# Patient Record
Sex: MALE | Race: BLACK OR AFRICAN AMERICAN | NOT HISPANIC OR LATINO | Employment: OTHER | URBAN - METROPOLITAN AREA
[De-identification: names, ages, dates, MRNs, and addresses within clinical notes are randomized per-mention and may not be internally consistent; named-entity substitution may affect disease eponyms.]

---

## 2017-01-05 ENCOUNTER — OFFICE VISIT (OUTPATIENT)
Dept: FAMILY MEDICINE | Facility: CLINIC | Age: 52
End: 2017-01-05
Payer: OTHER GOVERNMENT

## 2017-01-05 VITALS
SYSTOLIC BLOOD PRESSURE: 135 MMHG | WEIGHT: 226.19 LBS | HEART RATE: 78 BPM | BODY MASS INDEX: 32.38 KG/M2 | DIASTOLIC BLOOD PRESSURE: 84 MMHG | HEIGHT: 70 IN

## 2017-01-05 DIAGNOSIS — Z76.89 ESTABLISHING CARE WITH NEW DOCTOR, ENCOUNTER FOR: Primary | ICD-10-CM

## 2017-01-05 DIAGNOSIS — K21.9 GASTROESOPHAGEAL REFLUX DISEASE WITHOUT ESOPHAGITIS: ICD-10-CM

## 2017-01-05 DIAGNOSIS — R73.03 PREDIABETES: ICD-10-CM

## 2017-01-05 DIAGNOSIS — E78.5 HYPERLIPIDEMIA, UNSPECIFIED HYPERLIPIDEMIA TYPE: ICD-10-CM

## 2017-01-05 PROCEDURE — 99999 PR PBB SHADOW E&M-EST. PATIENT-LVL III: CPT | Mod: PBBFAC,,, | Performed by: FAMILY MEDICINE

## 2017-01-05 PROCEDURE — 99213 OFFICE O/P EST LOW 20 MIN: CPT | Mod: PBBFAC,PO | Performed by: FAMILY MEDICINE

## 2017-01-05 PROCEDURE — 99214 OFFICE O/P EST MOD 30 MIN: CPT | Mod: S$PBB,,, | Performed by: FAMILY MEDICINE

## 2017-01-05 RX ORDER — ASPIRIN 81 MG/1
81 TABLET ORAL DAILY
COMMUNITY
End: 2017-04-06 | Stop reason: SDUPTHER

## 2017-01-05 NOTE — PROGRESS NOTES
Subjective:       Patient ID: Jose Dior is a 51 y.o. male.    Chief Complaint: Establish Care    HPI     Establish Care  Pt reports to the clinic to establish care.   The pt has a medical history which includes DLD, GERD, and prediabetes.   As far as smoking is concerned, the pt denies.   The pt attempts to maintain a healthy diet and engages in regular exercise.   Consistent seatbelt usage reported.   Pt has no symptoms of depression.   Health maintenance addressed and updated in chart.    Review of Systems   Constitutional: Negative for chills and fever.   Respiratory: Negative for chest tightness and shortness of breath.    Cardiovascular: Negative for chest pain and leg swelling.   Gastrointestinal: Negative for abdominal pain, constipation, diarrhea and vomiting.   Genitourinary: Negative for decreased urine volume, dysuria and hematuria.   Musculoskeletal: Negative for arthralgias.   Neurological: Negative for weakness and light-headedness.       Objective:      Physical Exam   Constitutional: He appears well-developed and well-nourished. No distress.   Obese AAM   HENT:   Head: Normocephalic and atraumatic.   Mouth/Throat: Oropharynx is clear and moist. No oropharyngeal exudate.   Eyes: EOM are normal. Pupils are equal, round, and reactive to light.   Neck: Normal range of motion. Neck supple. No thyromegaly present.   Cardiovascular: Normal rate, regular rhythm, normal heart sounds and intact distal pulses.    Pulmonary/Chest: Effort normal and breath sounds normal. No respiratory distress. He has no wheezes.   Abdominal: Soft. Bowel sounds are normal. He exhibits no distension and no mass. There is no tenderness.   Musculoskeletal: He exhibits no edema.   Lymphadenopathy:     He has no cervical adenopathy.   Neurological: He is alert.   Skin: Skin is warm. No rash noted. No erythema.   Psychiatric: He has a normal mood and affect. His behavior is normal.   Vitals reviewed.      Assessment:        1. Establishing care with new doctor, encounter for    2. Gastroesophageal reflux disease without esophagitis    3. Adult BMI 30+    4. Hyperlipidemia, unspecified hyperlipidemia type        Plan:       1. Establishing care with new doctor, encounter for    2. Gastroesophageal reflux disease without esophagitis  Condition currently stable. No changes to medication regimen on today.    3. Adult BMI 30+  Patient has been advised to continue to maintain a healthy lifestyle, including regular exercise and consuming a well balanced diet.   - Basic metabolic panel; Future  - Lipid panel; Future  - Microalbumin/creatinine urine ratio; Future    4. Hyperlipidemia, unspecified hyperlipidemia type  - Basic metabolic panel; Future  - Lipid panel; Future  - Microalbumin/creatinine urine ratio; Future    5. Prediabetes  - Basic metabolic panel; Future  - Hemoglobin A1c; Future    Patient readiness: acceptance and barriers:none    During the course of the visit the patient was educated and counseled about the following:     Obesity:   Informal exercise measures discussed, e.g. taking stairs instead of elevator.  Regular aerobic exercise program discussed.    Goals: Obesity: Reduce calorie intake and BMI    Did patient meet goals/outcomes: No    The following self management tools provided: excercise log    Patient Instructions (the written plan) was given to the patient/family.     Time spent with patient: 15 minutes    Portions of this note were created using Dragon voice recognition software. There may be voice recognition errors found in the text, and attempts were made to correct these errors prior to signature    Jessee Lopez MD    Family Medicine  1/5/2017

## 2017-01-05 NOTE — MR AVS SNAPSHOT
[facility]  [address] [place] [address]  Phone: [phone]  Fax: [phone]                  Jose Dior   2017 9:40 AM   Office Visit    Description:  Male : 1965   Provider:  Jessee Lopez MD   Department:  [facility]           Reason for Visit     Establish Care           Diagnoses this Visit        Comments    Establishing care with new doctor, encounter for    -  Primary     Gastroesophageal reflux disease without esophagitis         Adult BMI 30+         Hyperlipidemia, unspecified hyperlipidemia type         Prediabetes                To Do List           Future Appointments        Provider Department Dept Phone    2017 9:15 AM LABROSALIA SAT [facility] - Lab [phone]    2017 9:30 AM SPECIMEN, ROSALIA [facility] - Lab [phone]    2017 9:40 AM Jessee Lopez MD [facility] [phone]      Goals (5 Years of Data)     None      Follow-Up and Disposition     Return in about 3 months (around 2017).    Follow-up and Disposition History      [facility]     [facility] -  Assistance  Registered nurses in the [facility] provide clinical advisement, health education, appointment booking, and other advisory services.  Call for this free service at [phone].             Medications           Message regarding Medications     Verify the changes and/or additions to your medication regime listed below are the same as discussed with your clinician today.  If any of these changes or additions are incorrect, please notify your healthcare provider.             Verify that the below list of medications is an accurate representation of the medications you are currently taking.  If none reported, the list may be blank. If incorrect, please contact your healthcare provider. Carry this list with you in case of emergency.           Current Medications      "aspirin (ECOTRIN) 81 MG EC tablet Take 81 mg by mouth once daily.    esomeprazole (NEXIUM) 40 MG capsule Take 1 capsule (40 mg total) by mouth before breakfast.    metformin (GLUCOPHAGE) 500 MG tablet Take 1 tablet (500 mg total) by mouth daily with breakfast.    pravastatin (PRAVACHOL) 40 MG tablet Take 40 mg by mouth once daily. Patient takes one-half of 40 MG of tablet. Taking 20 MG daily.    temazepam (RESTORIL) 30 mg capsule Take 30 mg by mouth nightly as needed for Insomnia.           Clinical Reference Information           Vital Signs - Last Recorded  Most recent update: 1/5/2017  9:57 AM by Mirella Patel MA    BP Pulse Ht Wt BMI    135/84 78 5' 10" (1.778 m) 102.6 kg (226 lb 3.1 oz) 32.46 kg/m2      Blood Pressure          Most Recent Value    BP  135/84      Allergies as of 1/5/2017     No Known Allergies      Immunizations Administered on Date of Encounter - 1/5/2017     None      Orders Placed During Today's Visit     Future Labs/Procedures Expected by Expires    Basic metabolic panel  1/5/2017 3/6/2018    Hemoglobin A1c  1/5/2017 3/6/2018    Lipid panel  1/5/2017 3/6/2018    Microalbumin/creatinine urine ratio  1/5/2017 3/6/2018      Instructions      Weight Management: Getting Started  Healthy bodies come in all shapes and sizes. Not all bodies are made to be thin. For some people, a healthy weight is higher than the average weight listed on weight charts. Your healthcare provider can help you decide on a healthy weight for you.    Reasons to lose weight  Losing weight can help with some health problems, such as high blood pressure, heart disease, diabetes, sleep apnea, and arthritis. You may also feel more energy.  Set your long-term goal  Your goal doesn't even have to be a specific weight. You may decide on a fitness goal (such as being able to walk 10 miles a week), or a health goal (such as lowering your blood pressure). Choose a goal that is measurable and reasonable, so you know when " you've reached it. A goal of reaching a BMI of less than 25 is not always reasonable (or possible).   Make an action plan  Habits dont change overnight. Setting your goals too high can leave you feeling discouraged if you cant reach them. Be realistic. Choose one or two small changes you can make now. Set an action plan for how you are going to make these changes. When you can stick to this plan, keep making a few more small changes. Taking small steps will help you stay on the path to success.  Track your progress  Write down your goals. Then, keep a daily record of your progress. Write down what you eat and how active you are. This record lets you look back on how much youve done. It may also help when youre feeling frustrated. Reward yourself for success. Even if you dont reach every goal, give yourself credit for what you do get done.  Get support  Encouragement from others can help make losing weight easier. Ask your family members and friends for support. They may even want to join you. Also look to your healthcare provider, registered dietitian, and  for help. Your local hospital can give you more information about nutrition, exercise, and weight loss.  © 4802-1251 [employer]. [address] [place] [address], [place] [address]. All rights reserved. This information is not intended as a substitute for professional medical care. Always follow your healthcare professional's instructions.        Diabetes (General Information)  Diabetes is a long-term health problem. It means your body does not make enough insulin. Or it may mean that your body cannot use the insulin it makes. Insulin is a hormone in your body. It lets blood sugar (glucose) reach the cells in your body. All of your cells need glucose for fuel.  When you have diabetes, the glucose in your blood builds up because it cannot get into the cells. This buildup is called high blood sugar (hyperglycemia).  Your blood sugar  level depends on several things. It depends on what kind of food you eat and how much of it you eat. It also depends on how much exercise you get, and how much insulin you have in your body. Eating too much of the wrong kinds of food or not taking diabetes medicine on time can cause high blood sugar. Infections can cause high blood sugar even if you are taking medicines correctly.  These things can also cause low blood sugar:  · Missing meals  · Not eating enough food  · Taking too much diabetes medicine  Diabetes can cause serious problems over time if you do not get treated. These problems include heart disease, stroke, kidney failure, and blindness. They also include nerve pain or loss of feeling in your legs and feet, and gangrene of the feet. By keeping your blood sugar under control you can prevent or delay these problems.  Normal blood sugar levels are 80 to 100 before a meal and less than 180 in the 1 to 2 hours after a meal.  Home care  Follow these guidelines when caring for yourself at home:  · Follow the diet your healthcare provider gives you. Take insulin or other diabetes medicine exactly as told to.  · Watch your blood sugar as you are told to. Keep a log of your results. This will help your provider change your medicines to keep your blood sugar under control.  · Try to reach your ideal weight. You may be able to cut back on or not have to take diabetes medicine if you eat the right foods and get exercise.  · Do not smoke. Smoking worsens the effects of diabetes on your circulation. You are much more likely to have a heart attack if you have diabetes and you smoke.  · Take good care of your feet. If you have lost feeling in your feet, you may not see an injury or infection. Check your feet and between your toes at least once a week.  · Wear a medical alert bracelet or necklace, or carry a card in your wallet that says you have diabetes. This will help healthcare providers give you the right care if  you get very ill and cannot tell them that you have diabetes.  Sick day plan  If you get a cold, the flu, or a bacterial or viral infection, take these steps:  · Look at your diabetes sick plan and call your healthcare provider as you were told to. You may need to call your provider right away if:  ¨ Your blood sugar is above 240 while taking your diabetes medicine  ¨ Your urine ketone levels are above normal or high  ¨ You have been vomiting more than 6 hours  ¨ You have trouble breathing or your breath ha s a fruity smell  ¨ You have a high fever  ¨ You have a fever for several days and you are not getting better  ¨ You get light-headed and are sleepier than usual  · Keep taking your diabetes pills (oral medicine) even if you have been vomiting and are feeling sick. Call your provider right away because you may need insulin to lower your blood sugar until you recover from your illness.  · Keep taking your insulin even if you have been vomiting and are feeling sick. Call your provider right away to ask if you need to change your insulin dose. This will depend on your blood sugar results.  · Check your blood sugar every 2 to 4 hours, or at least 4 times a day.  · Check your ketones often. If you are vomiting and having diarrhea, watch them more often.  · Do not skip meals. Try to eat small meals on a regular schedule. Do this even if you do not feel like eating.  · Drink water or other liquids that do not have caffeine or calories. This will keep you from getting dehydrated. If you are nauseated or vomiting, takes small sips every 5 minutes. To prevent dehydration try to drink a cup (8 ounces) of fluids every hour while you are awake.  General care  Always bring a source of fast-acting sugar with you in case you have symptoms of low blood sugar (below 70). At the first sign of low blood sugar, eat or drink 15 to 20 grams of fast-acting sugar to raise your blood sugar. Examples are:  · 3 to 4 glucose tablets. You can  buy these at most [employer].  · 4 ounces (1/2 cup) of regular (not diet) soft drinks  · 4 ounces (1/2 cup) of any fruit juice  · 8 ounces (1 cup) of milk  · 5 to 6 pieces of hard candy  · 1 tablespoon of honey  Check your blood sugar 15 minutes after treating yourself. If it is still below 70, take 15 to 20 more grams of fast-acting sugar. Test again in 15 minutes. If it returns to normal (70 or above), eat a snack or meal to keep your blood sugar in a safe range. If it stays low, call your doctor or go to an emergency room.  Follow-up care  Follow-up with your healthcare provider, or as advised. For more information about diabetes, visit the American Diabetes Association website at www.diabetes.org or call 759-165-8563.  When to seek medical advice  Call your healthcare provider right away if you have any of these symptoms of high blood sugar:  · Frequent urination  · Dizziness  · Drowsiness  · Thirst  · Headache  · Nausea or vomiting  · Abdominal pain  · Eyesight changes  · Fast breathing  · Confusion or loss of consciousness  Also call your provider right away if you have any of these signs of low blood sugar:  · Fatigue  · Headache  · Shakes  · Excess sweating  · Hunger  · Feeling anxious or restless  · Eyesight changes  · Drowsiness  · Weakness  · Confusion or loss of consciousness  Call 911  Call for emergency help right away if any of these occur:  · Chest pain or shortness of breath  · Dizziness or fainting  · Weakness of an arm or leg or one side of the face  · Trouble speaking or seeing   © 1367-6249 [employer]. [address] [place] [address], [place] [address]. All rights reserved. This information is not intended as a substitute for professional medical care. Always follow your healthcare professional's instructions.

## 2017-01-05 NOTE — PATIENT INSTRUCTIONS
Weight Management: Getting Started  Healthy bodies come in all shapes and sizes. Not all bodies are made to be thin. For some people, a healthy weight is higher than the average weight listed on weight charts. Your healthcare provider can help you decide on a healthy weight for you.    Reasons to lose weight  Losing weight can help with some health problems, such as high blood pressure, heart disease, diabetes, sleep apnea, and arthritis. You may also feel more energy.  Set your long-term goal  Your goal doesn't even have to be a specific weight. You may decide on a fitness goal (such as being able to walk 10 miles a week), or a health goal (such as lowering your blood pressure). Choose a goal that is measurable and reasonable, so you know when you've reached it. A goal of reaching a BMI of less than 25 is not always reasonable (or possible).   Make an action plan  Habits dont change overnight. Setting your goals too high can leave you feeling discouraged if you cant reach them. Be realistic. Choose one or two small changes you can make now. Set an action plan for how you are going to make these changes. When you can stick to this plan, keep making a few more small changes. Taking small steps will help you stay on the path to success.  Track your progress  Write down your goals. Then, keep a daily record of your progress. Write down what you eat and how active you are. This record lets you look back on how much youve done. It may also help when youre feeling frustrated. Reward yourself for success. Even if you dont reach every goal, give yourself credit for what you do get done.  Get support  Encouragement from others can help make losing weight easier. Ask your family members and friends for support. They may even want to join you. Also look to your healthcare provider, registered dietitian, and  for help. Your local hospital can give you more information about nutrition, exercise, and  weight loss.  © 1882-3783 [employer]. [address] [place] [address], [place] [address]. All rights reserved. This information is not intended as a substitute for professional medical care. Always follow your healthcare professional's instructions.        Diabetes (General Information)  Diabetes is a long-term health problem. It means your body does not make enough insulin. Or it may mean that your body cannot use the insulin it makes. Insulin is a hormone in your body. It lets blood sugar (glucose) reach the cells in your body. All of your cells need glucose for fuel.  When you have diabetes, the glucose in your blood builds up because it cannot get into the cells. This buildup is called high blood sugar (hyperglycemia).  Your blood sugar level depends on several things. It depends on what kind of food you eat and how much of it you eat. It also depends on how much exercise you get, and how much insulin you have in your body. Eating too much of the wrong kinds of food or not taking diabetes medicine on time can cause high blood sugar. Infections can cause high blood sugar even if you are taking medicines correctly.  These things can also cause low blood sugar:  · Missing meals  · Not eating enough food  · Taking too much diabetes medicine  Diabetes can cause serious problems over time if you do not get treated. These problems include heart disease, stroke, kidney failure, and blindness. They also include nerve pain or loss of feeling in your legs and feet, and gangrene of the feet. By keeping your blood sugar under control you can prevent or delay these problems.  Normal blood sugar levels are 80 to 100 before a meal and less than 180 in the 1 to 2 hours after a meal.  Home care  Follow these guidelines when caring for yourself at home:  · Follow the diet your healthcare provider gives you. Take insulin or other diabetes medicine exactly as told to.  · Watch your blood sugar as you are told to. Keep a log of  your results. This will help your provider change your medicines to keep your blood sugar under control.  · Try to reach your ideal weight. You may be able to cut back on or not have to take diabetes medicine if you eat the right foods and get exercise.  · Do not smoke. Smoking worsens the effects of diabetes on your circulation. You are much more likely to have a heart attack if you have diabetes and you smoke.  · Take good care of your feet. If you have lost feeling in your feet, you may not see an injury or infection. Check your feet and between your toes at least once a week.  · Wear a medical alert bracelet or necklace, or carry a card in your wallet that says you have diabetes. This will help healthcare providers give you the right care if you get very ill and cannot tell them that you have diabetes.  Sick day plan  If you get a cold, the flu, or a bacterial or viral infection, take these steps:  · Look at your diabetes sick plan and call your healthcare provider as you were told to. You may need to call your provider right away if:  ¨ Your blood sugar is above 240 while taking your diabetes medicine  ¨ Your urine ketone levels are above normal or high  ¨ You have been vomiting more than 6 hours  ¨ You have trouble breathing or your breath ha s a fruity smell  ¨ You have a high fever  ¨ You have a fever for several days and you are not getting better  ¨ You get light-headed and are sleepier than usual  · Keep taking your diabetes pills (oral medicine) even if you have been vomiting and are feeling sick. Call your provider right away because you may need insulin to lower your blood sugar until you recover from your illness.  · Keep taking your insulin even if you have been vomiting and are feeling sick. Call your provider right away to ask if you need to change your insulin dose. This will depend on your blood sugar results.  · Check your blood sugar every 2 to 4 hours, or at least 4 times a day.  · Check your  ketones often. If you are vomiting and having diarrhea, watch them more often.  · Do not skip meals. Try to eat small meals on a regular schedule. Do this even if you do not feel like eating.  · Drink water or other liquids that do not have caffeine or calories. This will keep you from getting dehydrated. If you are nauseated or vomiting, takes small sips every 5 minutes. To prevent dehydration try to drink a cup (8 ounces) of fluids every hour while you are awake.  General care  Always bring a source of fast-acting sugar with you in case you have symptoms of low blood sugar (below 70). At the first sign of low blood sugar, eat or drink 15 to 20 grams of fast-acting sugar to raise your blood sugar. Examples are:  · 3 to 4 glucose tablets. You can buy these at most [address].  · 4 ounces (1/2 cup) of regular (not diet) soft drinks  · 4 ounces (1/2 cup) of any fruit juice  · 8 ounces (1 cup) of milk  · 5 to 6 pieces of hard candy  · 1 tablespoon of honey  Check your blood sugar 15 minutes after treating yourself. If it is still below 70, take 15 to 20 more grams of fast-acting sugar. Test again in 15 minutes. If it returns to normal (70 or above), eat a snack or meal to keep your blood sugar in a safe range. If it stays low, call your doctor or go to an emergency room.  Follow-up care  Follow-up with your healthcare provider, or as advised. For more information about diabetes, visit the American Diabetes Association website at www.diabetes.org or call 970-717-7739.  When to seek medical advice  Call your healthcare provider right away if you have any of these symptoms of high blood sugar:  · Frequent urination  · Dizziness  · Drowsiness  · Thirst  · Headache  · Nausea or vomiting  · Abdominal pain  · Eyesight changes  · Fast breathing  · Confusion or loss of consciousness  Also call your provider right away if you have any of these signs of low blood sugar:  · Fatigue  · Headache  · Shakes  · Excess  sweating  · Hunger  · Feeling anxious or restless  · Eyesight changes  · Drowsiness  · Weakness  · Confusion or loss of consciousness  Call 911  Call for emergency help right away if any of these occur:  · Chest pain or shortness of breath  · Dizziness or fainting  · Weakness of an arm or leg or one side of the face  · Trouble speaking or seeing   © 2181-9918 [employer]. [address] [place] [address], [place] [address]. All rights reserved. This information is not intended as a substitute for professional medical care. Always follow your healthcare professional's instructions.

## 2017-01-06 ENCOUNTER — LAB VISIT (OUTPATIENT)
Dept: LAB | Facility: HOSPITAL | Age: 52
End: 2017-01-06
Attending: FAMILY MEDICINE
Payer: OTHER GOVERNMENT

## 2017-01-06 DIAGNOSIS — E78.5 HYPERLIPIDEMIA, UNSPECIFIED HYPERLIPIDEMIA TYPE: ICD-10-CM

## 2017-01-06 DIAGNOSIS — R73.03 PREDIABETES: ICD-10-CM

## 2017-01-06 LAB
ANION GAP SERPL CALC-SCNC: 8 MMOL/L
BUN SERPL-MCNC: 15 MG/DL
CALCIUM SERPL-MCNC: 9.4 MG/DL
CHLORIDE SERPL-SCNC: 107 MMOL/L
CHOLEST/HDLC SERPL: 3.6 {RATIO}
CO2 SERPL-SCNC: 25 MMOL/L
CREAT SERPL-MCNC: 1.6 MG/DL
EST. GFR  (AFRICAN AMERICAN): 56.8 ML/MIN/1.73 M^2
EST. GFR  (NON AFRICAN AMERICAN): 49.2 ML/MIN/1.73 M^2
GLUCOSE SERPL-MCNC: 122 MG/DL
HDL/CHOLESTEROL RATIO: 27.9 %
HDLC SERPL-MCNC: 190 MG/DL
HDLC SERPL-MCNC: 53 MG/DL
LDLC SERPL CALC-MCNC: 116.8 MG/DL
NONHDLC SERPL-MCNC: 137 MG/DL
POTASSIUM SERPL-SCNC: 4.2 MMOL/L
SODIUM SERPL-SCNC: 140 MMOL/L
TRIGL SERPL-MCNC: 101 MG/DL

## 2017-01-06 PROCEDURE — 83036 HEMOGLOBIN GLYCOSYLATED A1C: CPT

## 2017-01-06 PROCEDURE — 36415 COLL VENOUS BLD VENIPUNCTURE: CPT | Mod: PO

## 2017-01-06 PROCEDURE — 80061 LIPID PANEL: CPT

## 2017-01-06 PROCEDURE — 80048 BASIC METABOLIC PNL TOTAL CA: CPT

## 2017-01-08 LAB
ESTIMATED AVG GLUCOSE: 123 MG/DL
HBA1C MFR BLD HPLC: 5.9 %

## 2017-04-06 ENCOUNTER — OFFICE VISIT (OUTPATIENT)
Dept: FAMILY MEDICINE | Facility: CLINIC | Age: 52
End: 2017-04-06
Payer: OTHER GOVERNMENT

## 2017-04-06 VITALS
WEIGHT: 226.44 LBS | DIASTOLIC BLOOD PRESSURE: 84 MMHG | HEART RATE: 75 BPM | SYSTOLIC BLOOD PRESSURE: 125 MMHG | HEIGHT: 70 IN | BODY MASS INDEX: 32.42 KG/M2

## 2017-04-06 DIAGNOSIS — N18.30 CKD (CHRONIC KIDNEY DISEASE) STAGE 3, GFR 30-59 ML/MIN: ICD-10-CM

## 2017-04-06 PROCEDURE — 99213 OFFICE O/P EST LOW 20 MIN: CPT | Mod: PBBFAC,PO | Performed by: FAMILY MEDICINE

## 2017-04-06 PROCEDURE — 99213 OFFICE O/P EST LOW 20 MIN: CPT | Mod: S$PBB,,, | Performed by: FAMILY MEDICINE

## 2017-04-06 PROCEDURE — 99999 PR PBB SHADOW E&M-EST. PATIENT-LVL III: CPT | Mod: PBBFAC,,, | Performed by: FAMILY MEDICINE

## 2017-04-06 RX ORDER — ASPIRIN 81 MG/1
81 TABLET ORAL DAILY
Qty: 90 TABLET | Refills: 3 | Status: SHIPPED | OUTPATIENT
Start: 2017-04-06

## 2017-04-06 RX ORDER — METFORMIN HYDROCHLORIDE 500 MG/1
500 TABLET ORAL
COMMUNITY

## 2017-04-06 NOTE — MR AVS SNAPSHOT
[facility]  [address] [address]  Phone: [phone]  Fax: [phone]                  Jose Dior   2017 9:40 AM   Office Visit    Description:  Male : 1965   Provider:  Jessee Lopez MD   Department:  [facility]           Reason for Visit     Follow-up           Diagnoses this Visit        Comments    CKD (chronic kidney disease) stage 3, GFR 30-59 ml/min                To Do List           Future Appointments        Provider Department Dept Phone    10/6/2017 10:40 AM Jessee Lopez MD [facility] [phone]      Goals (5 Years of Data)     None      Follow-Up and Disposition     Return in about 6 months (around 10/6/2017).    Follow-up and Disposition History       These Medications        Disp Refills Start End    aspirin (ECOTRIN) 81 MG EC tablet 90 tablet 3 2017     Take 1 tablet (81 mg total) by mouth once daily. - Oral    Pharmacy: [address] Ph #: [phone]         [facility] On Call     [facility] On Call Nurse Care Line -  Assistance  Unless otherwise directed by your provider, please contact [facility], our nurse care line that is available for  assistance.     Registered nurses in the [facility] provide: appointment scheduling, clinical advisement, health education, and other advisory services.  Call: [phone] (toll free)               Medications           Message regarding Medications     Verify the changes and/or additions to your medication regime listed below are the same as discussed with your clinician today.  If any of these changes or additions are incorrect, please notify your healthcare provider.        CHANGE how you are taking these medications     Start Taking Instead of    aspirin (ECOTRIN) 81 MG EC tablet aspirin (ECOTRIN) 81 MG EC tablet    Dosage:  Take 1 tablet (81 mg total) by mouth once daily.  "Dosage:  Take 81 mg by mouth once daily.    Reason for Change:  Reorder            Verify that the below list of medications is an accurate representation of the medications you are currently taking.  If none reported, the list may be blank. If incorrect, please contact your healthcare provider. Carry this list with you in case of emergency.           Current Medications     aspirin (ECOTRIN) 81 MG EC tablet Take 1 tablet (81 mg total) by mouth once daily.    metformin (GLUCOPHAGE) 500 MG tablet Take 500 mg by mouth 2 (two) times daily with meals.    pravastatin (PRAVACHOL) 40 MG tablet Take 40 mg by mouth once daily. Patient takes one-half of 40 MG of tablet. Taking 20 MG daily.    psyllium (METAMUCIL) powder Take 1 packet by mouth 3 (three) times daily.    temazepam (RESTORIL) 30 mg capsule Take 30 mg by mouth nightly as needed for Insomnia.    esomeprazole (NEXIUM) 40 MG capsule Take 1 capsule (40 mg total) by mouth before breakfast.           Clinical Reference Information           Your Vitals Were     BP Pulse Height Weight BMI    125/84 75 5' 10" (1.778 m) 102.7 kg (226 lb 6.6 oz) 32.49 kg/m2      Blood Pressure          Most Recent Value    BP  125/84      Allergies as of 4/6/2017     No Known Allergies      Immunizations Administered on Date of Encounter - 4/6/2017     None      Orders Placed During Today's Visit      Normal Orders This Visit    Ambulatory referral to Nephrology       Instructions      Kidney Disease: Eating Less Sodium  Sodium is a mineral that the body needs in small amounts. Sodium is found in table salt. Most people eat far more salt than they need. There are two main reasons for this: Salt is present in high amounts in most processed foods (pre-prepared foods like breakfast cereals, cookies, and pickles) and in all restaurant foods. In other words, if you are not cooking from fresh ingredients at home, you are very likely eating more salt than you need. When sodium intake is too high, " "it can increase thirst and cause the body to retain fluid. To avoid these side effects, people with chronic kidney disease are often told to eat less sodium. The tips on this sheet can show you how.  People with chronic kidney disease should restrict their salt intake to less than 1,500 milligrams (mg) of sodium daily. Food labels generally report the sodium content. Table salt is "sodium chloride." One level teaspoon of table salt contains 2.300 mg of sodium.    When you shop for food  Unlike canned and processed foods, fresh foods have no added salt and are better for you. When youre food shopping:  · Choose fresh foods when you can.  · Read food labels before buying packaged foods. Check the labels nutrition facts for sodium amounts and servings per package.  · Try to pick packaged foods with a sodium content of 140 mg (milligrams) or less per serving.  · Do not choose foods with over 400 mg of sodium per serving.  Season instead of salt  Try the seasonings and foods listed below to season without sodium.  · Basil: tomatoes, squash, eggplant, soups, fish  · Zacarias: soups, rice, lentils, chicken  · Dill: beets, cucumbers, green beans  · Garlic: sauces, vegetables, meats, fish  · Jacqui: carrots, chicken, cooked fruit, white sauces  · Lemon: asparagus, artichokes, broccoli, spinach, fish  · Mint: cold soups, salads, fruit dishes  · Oregano: eggplant, chicken, salads, sauces  · Thyme: chicken, fish, lean meats, soups, stews  Food that has salt added during cooking tastes less salty than if salt is sprinkled on it at the table. Therefore, use half the amount of salt you want to have in your food during cooking, and sprinkle the other half on the food at the table.  Do not use seasoned salt or salt substitutes. They may contain sodium or potassium (another mineral people with kidney disease are often told to limit).  Date Last Reviewed: 1/5/2015  © 2087-9714 [employer]. [address] [place] [address], [place], " [place] [address]. All rights reserved. This information is not intended as a substitute for professional medical care. Always follow your healthcare professional's instructions.             Language Assistance Services     ATTENTION: Language assistance services are available, free of charge. Please call [phone].      ATENCIÓN: Si habla ana rosa, tiene a sanchez disposición servicios gratuitos de asistencia lingüística. Llame al [phone].     CHÚ Ý: N?u b?n nói Ti?ng Vi?t, có các d?ch v? h? tr? ngôn ng? mi?n phí dành cho b?n. G?i s? [phone].         [facility] complies with applicable Federal civil rights laws and does not discriminate on the basis of race, color, national origin, age, disability, or sex.

## 2017-04-06 NOTE — PROGRESS NOTES
Subjective:       Patient ID: Jose Dior is a 51 y.o. male.    Chief Complaint: Follow-up (3month)    HPI     Chronic kidney disease  PT was noted to have an elevated creatinine with decreased eGFR.   He states that he had a brother with renal failure.   Pt has not been seen by a nephrologist in the past.   He was seen approx 5 years ago for concerns as it relates to his renal function.   Pt has not been followed by them since.   He has not had changes to urination.   He has not been taking any medications that may be nephrotoxic.     Review of Systems   Constitutional: Negative for chills and fever.   Respiratory: Negative for chest tightness and shortness of breath.    Cardiovascular: Negative for chest pain and leg swelling.   Gastrointestinal: Negative for abdominal pain, constipation, diarrhea and vomiting.   Genitourinary: Negative for decreased urine volume, dysuria and hematuria.   Musculoskeletal: Negative for arthralgias.   Neurological: Negative for weakness and light-headedness.       Objective:      Physical Exam   Constitutional: He appears well-developed and well-nourished. No distress.   Obese male in NAD.    HENT:   Head: Normocephalic and atraumatic.   Mouth/Throat: Oropharynx is clear and moist. No oropharyngeal exudate.   Eyes: EOM are normal. Pupils are equal, round, and reactive to light.   Neck: Normal range of motion. Neck supple. No thyromegaly present.   Cardiovascular: Normal rate, regular rhythm, normal heart sounds and intact distal pulses.    Pulmonary/Chest: Effort normal and breath sounds normal. No respiratory distress. He has no wheezes.   Abdominal: Soft. Bowel sounds are normal. He exhibits no distension and no mass. There is no tenderness.   Musculoskeletal: He exhibits no edema.   Lymphadenopathy:     He has no cervical adenopathy.   Neurological: He is alert.   Skin: Skin is warm. No rash noted. No erythema.   Psychiatric: He has a normal mood and affect. His  behavior is normal.   Vitals reviewed.      Assessment:       1. CKD (chronic kidney disease) stage 3, GFR 30-59 ml/min        Plan:       1. CKD (chronic kidney disease) stage 3, GFR 30-59 ml/min  - Ambulatory referral to Nephrology    Portions of this note were created using Dragon voice recognition software. There may be voice recognition errors found in the text, and attempts were made to correct these errors prior to signature    Jessee Lopez MD    Family Medicine  4/6/2017

## 2017-04-06 NOTE — PATIENT INSTRUCTIONS
"  Kidney Disease: Eating Less Sodium  Sodium is a mineral that the body needs in small amounts. Sodium is found in table salt. Most people eat far more salt than they need. There are two main reasons for this: Salt is present in high amounts in most processed foods (pre-prepared foods like breakfast cereals, cookies, and pickles) and in all restaurant foods. In other words, if you are not cooking from fresh ingredients at home, you are very likely eating more salt than you need. When sodium intake is too high, it can increase thirst and cause the body to retain fluid. To avoid these side effects, people with chronic kidney disease are often told to eat less sodium. The tips on this sheet can show you how.  People with chronic kidney disease should restrict their salt intake to less than 1,500 milligrams (mg) of sodium daily. Food labels generally report the sodium content. Table salt is "sodium chloride." One level teaspoon of table salt contains 2.300 mg of sodium.    When you shop for food  Unlike canned and processed foods, fresh foods have no added salt and are better for you. When youre food shopping:  · Choose fresh foods when you can.  · Read food labels before buying packaged foods. Check the labels nutrition facts for sodium amounts and servings per package.  · Try to pick packaged foods with a sodium content of 140 mg (milligrams) or less per serving.  · Do not choose foods with over 400 mg of sodium per serving.  Season instead of salt  Try the seasonings and foods listed below to season without sodium.  · Basil: tomatoes, squash, eggplant, soups, fish  · Zacarias: soups, rice, lentils, chicken  · Dill: beets, cucumbers, green beans  · Garlic: sauces, vegetables, meats, fish  · Jacqui: carrots, chicken, cooked fruit, white sauces  · Lemon: asparagus, artichokes, broccoli, spinach, fish  · Mint: cold soups, salads, fruit dishes  · Oregano: eggplant, chicken, salads, sauces  · Thyme: chicken, fish, lean " meats, soups, stews  Food that has salt added during cooking tastes less salty than if salt is sprinkled on it at the table. Therefore, use half the amount of salt you want to have in your food during cooking, and sprinkle the other half on the food at the table.  Do not use seasoned salt or salt substitutes. They may contain sodium or potassium (another mineral people with kidney disease are often told to limit).  Date Last Reviewed: 1/5/2015 © 2000-2016 [employer]. [address] [place] [address]. All rights reserved. This information is not intended as a substitute for professional medical care. Always follow your healthcare professional's instructions.

## 2017-04-19 ENCOUNTER — TELEPHONE (OUTPATIENT)
Dept: NEPHROLOGY | Facility: CLINIC | Age: 52
End: 2017-04-19

## 2017-04-24 ENCOUNTER — OFFICE VISIT (OUTPATIENT)
Dept: NEPHROLOGY | Facility: CLINIC | Age: 52
End: 2017-04-24
Payer: OTHER GOVERNMENT

## 2017-04-24 VITALS
HEART RATE: 80 BPM | HEIGHT: 70 IN | DIASTOLIC BLOOD PRESSURE: 66 MMHG | SYSTOLIC BLOOD PRESSURE: 114 MMHG | TEMPERATURE: 98 F | WEIGHT: 223.31 LBS | OXYGEN SATURATION: 96 % | BODY MASS INDEX: 31.97 KG/M2

## 2017-04-24 DIAGNOSIS — Z92.89 HISTORY OF BLOOD TRANSFUSION: ICD-10-CM

## 2017-04-24 DIAGNOSIS — R73.02 IMPAIRED GLUCOSE TOLERANCE: ICD-10-CM

## 2017-04-24 DIAGNOSIS — N18.30 CKD (CHRONIC KIDNEY DISEASE) STAGE 3, GFR 30-59 ML/MIN: Primary | ICD-10-CM

## 2017-04-24 PROCEDURE — 99204 OFFICE O/P NEW MOD 45 MIN: CPT | Mod: S$PBB,,, | Performed by: INTERNAL MEDICINE

## 2017-04-24 PROCEDURE — 99213 OFFICE O/P EST LOW 20 MIN: CPT | Mod: PBBFAC,PO | Performed by: INTERNAL MEDICINE

## 2017-04-24 PROCEDURE — 99999 PR PBB SHADOW E&M-EST. PATIENT-LVL III: CPT | Mod: PBBFAC,,, | Performed by: INTERNAL MEDICINE

## 2017-04-24 NOTE — LETTER
April 26, 2017      Jessee Lopez MD  [address]  [address]  [place] [address]           [facility] [address] [facility] [address] [place] [address]  Phone: [phone]          Patient: Jose Dior   MR Number: [id number]   YOB: 1965   Date of Visit: 4/24/2017       Dear Dr. Jessee Lopez:    Thank you for referring Jose Dior to me for evaluation. Attached you will find relevant portions of my assessment and plan of care.    If you have questions, please do not hesitate to call me. I look forward to following Jose Dior along with you.    Sincerely,    Jake Cline MD    Enclosure  CC:  No Recipients    If you would like to receive this communication electronically, please contact [email or web address] or [phone] to request more information on [facility] [employer] access.    For providers and/or their staff who would like to refer a patient to [facility], please contact us through our one-stop-shop provider referral line, [facility] , at [phone].    If you feel you have received this communication in error or would no longer like to receive these types of communications, please e-mail [email or web address]

## 2017-04-26 PROBLEM — Z92.89 HISTORY OF BLOOD TRANSFUSION: Status: ACTIVE | Noted: 2017-04-26

## 2017-04-26 NOTE — PROGRESS NOTES
Subjective:       Patient ID: Jose Dior is a 51 y.o. Black or  male who presents for new evaluation of Chronic Kidney Disease    HPI     He is referred by his PCP for increased serum creatinine measured at 1.4-1.6mg/dL since .    Significant past metabolic history is glucose intolerance and is maintained on metformin for this. No HTN or overt DM. He has a history of premature birth and states he required a blood transfusion when he was a . He has a history of heavy NSAIDs use as well as PPI.s.     He denies foamy urine, hematuria and no flank pain. He follows a low sodium diet and also consumes water as his main beverage (recent changes) No edema nor SOB. No current NSAID use nor herbal medications. His half-brother was on dialysis.     Review of Systems   Constitutional: Negative for activity change, appetite change, fatigue and unexpected weight change.   HENT: Negative for facial swelling.    Respiratory: Negative for cough and shortness of breath.    Cardiovascular: Negative for chest pain and leg swelling.   Gastrointestinal: Negative for abdominal pain.   Genitourinary: Negative for difficulty urinating, dysuria, frequency and hematuria.   Musculoskeletal: Negative for arthralgias.   Neurological: Negative for weakness and headaches.   Hematological: Does not bruise/bleed easily.   Psychiatric/Behavioral: Negative for decreased concentration.       Objective:      Physical Exam   Constitutional: He is oriented to person, place, and time. He appears well-developed and well-nourished. No distress.   Neck: No JVD present.   Cardiovascular: S1 normal and S2 normal.  Exam reveals no friction rub.    Pulmonary/Chest: Breath sounds normal. He has no wheezes. He has no rales.   Abdominal: Soft.   Musculoskeletal: He exhibits no edema.   Neurological: He is alert and oriented to person, place, and time.   Skin: Skin is warm and dry.   Psychiatric: He has a normal mood and affect.    Vitals reviewed.      Assessment:       1. CKD (chronic kidney disease) stage 3, GFR 30-59 ml/min    2. Premature birth    3. History of blood transfusion    4. Impaired glucose tolerance    5. Adult BMI 30+        Plan:             CKD Stage 3 with unclear etiology. He has a few risk factors including premature, history of heavy NSAID use in the past and possibly PPI nephrotoxicity.     [facility] and will [facility] first so as to not duplicate [facility].      [facility]

## 2017-10-05 ENCOUNTER — DOCUMENTATION ONLY (OUTPATIENT)
Dept: FAMILY MEDICINE | Facility: CLINIC | Age: 52
End: 2017-10-05

## 2017-10-05 NOTE — PROGRESS NOTES
Pre-Visit Chart Review  For Appointment Scheduled on (10/6)    Health Maintenance Due   Topic Date Due    TETANUS VACCINE  11/18/1983    Influenza Vaccine  08/01/2017

## 2017-10-06 ENCOUNTER — OFFICE VISIT (OUTPATIENT)
Dept: FAMILY MEDICINE | Facility: CLINIC | Age: 52
End: 2017-10-06
Payer: OTHER GOVERNMENT

## 2017-10-06 VITALS
HEIGHT: 70 IN | TEMPERATURE: 99 F | OXYGEN SATURATION: 97 % | BODY MASS INDEX: 31.06 KG/M2 | HEART RATE: 65 BPM | WEIGHT: 216.94 LBS | DIASTOLIC BLOOD PRESSURE: 84 MMHG | SYSTOLIC BLOOD PRESSURE: 126 MMHG

## 2017-10-06 DIAGNOSIS — E78.5 HYPERLIPIDEMIA, UNSPECIFIED HYPERLIPIDEMIA TYPE: ICD-10-CM

## 2017-10-06 DIAGNOSIS — Z00.00 WELLNESS EXAMINATION: Primary | ICD-10-CM

## 2017-10-06 DIAGNOSIS — N18.30 CKD (CHRONIC KIDNEY DISEASE) STAGE 3, GFR 30-59 ML/MIN: ICD-10-CM

## 2017-10-06 DIAGNOSIS — G47.33 OSA (OBSTRUCTIVE SLEEP APNEA): ICD-10-CM

## 2017-10-06 DIAGNOSIS — R73.03 PREDIABETES: ICD-10-CM

## 2017-10-06 PROCEDURE — 99999 PR PBB SHADOW E&M-EST. PATIENT-LVL III: CPT | Mod: PBBFAC,,, | Performed by: FAMILY MEDICINE

## 2017-10-06 PROCEDURE — 99213 OFFICE O/P EST LOW 20 MIN: CPT | Mod: S$PBB,,, | Performed by: FAMILY MEDICINE

## 2017-10-06 PROCEDURE — 99213 OFFICE O/P EST LOW 20 MIN: CPT | Mod: PBBFAC,PO | Performed by: FAMILY MEDICINE

## 2017-10-06 RX ORDER — GUAIFENESIN AND PHENYLEPHRINE HCL 400; 10 MG/1; MG/1
1 TABLET ORAL DAILY
COMMUNITY

## 2017-10-06 NOTE — PROGRESS NOTES
Subjective:       Patient ID: Jose Dior is a 51 y.o. male.    Chief Complaint: Follow-up    HPI     Follow up  Pt is here to follow up multiple chronic medical conditions.   Pt does reports compliance with medications.   The pt has a current PMH of HLD, CKD and prediabetes.   As it relates to exercise, the pt reports pt states that he rides a bicycle and does weight lifting for exercise.   As far as smoking is concerned, the pt denies.   Pt has been making attempts at eating healthy.  Health maintenance addressed and updated in chart.    Review of Systems   Constitutional: Negative for chills and fever.   Respiratory: Negative for chest tightness and shortness of breath.    Cardiovascular: Negative for chest pain and leg swelling.   Gastrointestinal: Negative for abdominal pain, constipation, diarrhea and vomiting.   Genitourinary: Negative for decreased urine volume, dysuria and hematuria.   Musculoskeletal: Positive for arthralgias, back pain and neck pain.   Neurological: Negative for weakness and light-headedness.       Objective:      Physical Exam   Constitutional: He appears well-developed and well-nourished. No distress.   HENT:   Head: Normocephalic and atraumatic.   Mouth/Throat: Oropharynx is clear and moist. No oropharyngeal exudate.   Eyes: EOM are normal. Pupils are equal, round, and reactive to light.   Neck: Normal range of motion. Neck supple. No thyromegaly present.   Cardiovascular: Normal rate, regular rhythm, normal heart sounds and intact distal pulses.    Pulmonary/Chest: Effort normal and breath sounds normal. No respiratory distress. He has no wheezes.   Abdominal: Soft. Bowel sounds are normal. He exhibits no distension and no mass. There is no tenderness.   Musculoskeletal: He exhibits no edema.   Lymphadenopathy:     He has no cervical adenopathy.   Neurological: He is alert.   Skin: Skin is warm. No rash noted. No erythema.   Psychiatric: He has a normal mood and affect. His  behavior is normal.   Vitals reviewed.      Assessment:       1. Wellness examination    2. JAMEE (obstructive sleep apnea)    3. Hyperlipidemia, unspecified hyperlipidemia type    4. CKD (chronic kidney disease) stage 3, GFR 30-59 ml/min    5. Prediabetes        Plan:       1. Wellness examination  Patient has been advised to continue to maintain a healthy lifestyle, including regular exercise and consuming a well balanced diet.     2. JAMEE (obstructive sleep apnea)  [facility]   Now on CPAP and pt reports compliance.     3. Hyperlipidemia, unspecified hyperlipidemia type  The 10-year ASCVD risk score (Albertglenna BARTHOLOMEW Jr., et al., 2013) is: 5.2%    Values used to calculate the score:      Age: 51 years      Sex: Male      Is Non- : Yes      Diabetic: No      Tobacco smoker: No      Systolic Blood Pressure: 126 mmHg      Is BP treated: No      HDL Cholesterol: 53 mg/dL      Total Cholesterol: 190 mg/dL  Continue pravastatin.     4. CKD (chronic kidney disease) stage 3, GFR 30-59 ml/min  Patient is followed by specialist for this condition. No changes to management of this condition on today.     5. Prediabetes  Continue metformin.   Follow A1c, yearly.     Portions of this note were created using Dragon voice recognition software. There may be voice recognition errors found in the text, and attempts were made to correct these errors prior to signature    Jessee Lopez MD    Family Medicine  10/6/2017

## 2017-10-13 ENCOUNTER — TELEPHONE (OUTPATIENT)
Dept: FAMILY MEDICINE | Facility: CLINIC | Age: 52
End: 2017-10-13

## 2017-10-13 NOTE — TELEPHONE ENCOUNTER
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 However, Dr. Castillo at [facility] is. Scheduled pt. With him on 10/16

## 2017-10-13 NOTE — TELEPHONE ENCOUNTER
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      Patient call back number is [phone]

## 2017-10-16 ENCOUNTER — DOCUMENTATION ONLY (OUTPATIENT)
Dept: FAMILY MEDICINE | Facility: CLINIC | Age: 52
End: 2017-10-16

## 2017-10-16 ENCOUNTER — OFFICE VISIT (OUTPATIENT)
Dept: FAMILY MEDICINE | Facility: CLINIC | Age: 52
End: 2017-10-16
Payer: OTHER GOVERNMENT

## 2017-10-16 VITALS
RESPIRATION RATE: 16 BRPM | BODY MASS INDEX: 31.66 KG/M2 | HEART RATE: 72 BPM | OXYGEN SATURATION: 98 % | TEMPERATURE: 98 F | DIASTOLIC BLOOD PRESSURE: 88 MMHG | SYSTOLIC BLOOD PRESSURE: 137 MMHG | WEIGHT: 221.13 LBS | HEIGHT: 70 IN

## 2017-10-16 DIAGNOSIS — Z02.4 ENCOUNTER FOR CDL (COMMERCIAL DRIVING LICENSE) EXAM: Primary | ICD-10-CM

## 2017-10-16 PROCEDURE — 81002 URINALYSIS NONAUTO W/O SCOPE: CPT | Mod: S$GLB,,, | Performed by: INTERNAL MEDICINE

## 2017-10-16 PROCEDURE — 99396 PREV VISIT EST AGE 40-64: CPT | Mod: 25,S$GLB,, | Performed by: INTERNAL MEDICINE

## 2017-10-16 NOTE — PROGRESS NOTES
"Subjective:       Patient ID: Jose Dior is a 51 y.o. male.    Chief Complaint: 's License Exam    HPI the patient is here for CDL license.  He has mild hyperglycemia but not diabetes and takes metformin for that has diagnosed obstructive sleep apnea but uses a CPAP machine 7 to 8 hours a night.  Review of Systems    Objective:      Vitals:    10/16/17 1357   BP: 137/88   Pulse: 72   Resp: 16   Temp: 98.1 °F (36.7 °C)   TempSrc: Oral   SpO2: 98%   Weight: 100.3 kg (221 lb 1.9 oz)   Height: 5' 10" (1.778 m)   PainSc: 0-No pain     Physical Exam   Constitutional: He appears well-developed and well-nourished.   Eyes: Pupils are equal, round, and reactive to light.   Cardiovascular: Normal rate, regular rhythm and normal heart sounds.    Pulmonary/Chest: Effort normal and breath sounds normal.   Abdominal: Soft. There is no tenderness.   Neurological: He is alert.   Psychiatric: He has a normal mood and affect. His behavior is normal. Thought content normal.   Nursing note and vitals reviewed.        Assessment:       1. Encounter for CDL (commercial driving license) exam          Plan:   1 year license given.  Encounter for CDL (commercial driving license) exam      No Follow-up on file.      "

## 2017-10-16 NOTE — PROGRESS NOTES
Health Maintenance Due   Topic Date Due    TETANUS VACCINE  11/18/1983    Influenza Vaccine  08/01/2017

## 2017-10-18 LAB
BILIRUB SERPL-MCNC: NORMAL MG/DL
BLOOD URINE, POC: NORMAL
COLOR, POC UA: YELLOW
GLUCOSE UR QL STRIP: NORMAL
KETONES UR QL STRIP: NORMAL
LEUKOCYTE ESTERASE URINE, POC: NORMAL
NITRITE, POC UA: NORMAL
PH, POC UA: 5
PROTEIN, POC: NORMAL
SPECIFIC GRAVITY, POC UA: 1.01
UROBILINOGEN, POC UA: NORMAL

## 2018-01-08 ENCOUNTER — DOCUMENTATION ONLY (OUTPATIENT)
Dept: FAMILY MEDICINE | Facility: CLINIC | Age: 53
End: 2018-01-08

## 2018-01-08 NOTE — PROGRESS NOTES
Pre-Visit Chart Review  For Appointment Scheduled on (1/9/18)    Health Maintenance Due   Topic Date Due    TETANUS VACCINE  11/18/1983    Influenza Vaccine  08/01/2017    Lipid Panel  01/06/2018

## 2018-01-09 ENCOUNTER — OFFICE VISIT (OUTPATIENT)
Dept: FAMILY MEDICINE | Facility: CLINIC | Age: 53
End: 2018-01-09
Payer: OTHER GOVERNMENT

## 2018-01-09 VITALS
HEIGHT: 70 IN | DIASTOLIC BLOOD PRESSURE: 82 MMHG | WEIGHT: 226.88 LBS | SYSTOLIC BLOOD PRESSURE: 122 MMHG | BODY MASS INDEX: 32.48 KG/M2

## 2018-01-09 DIAGNOSIS — Z12.5 PROSTATE CANCER SCREENING: ICD-10-CM

## 2018-01-09 DIAGNOSIS — N18.30 CKD (CHRONIC KIDNEY DISEASE) STAGE 3, GFR 30-59 ML/MIN: ICD-10-CM

## 2018-01-09 DIAGNOSIS — E78.5 HYPERLIPIDEMIA, UNSPECIFIED HYPERLIPIDEMIA TYPE: Primary | ICD-10-CM

## 2018-01-09 DIAGNOSIS — R73.03 PREDIABETES: ICD-10-CM

## 2018-01-09 DIAGNOSIS — E66.9 OBESITY (BMI 30-39.9): ICD-10-CM

## 2018-01-09 PROCEDURE — 99214 OFFICE O/P EST MOD 30 MIN: CPT | Mod: S$PBB,,, | Performed by: FAMILY MEDICINE

## 2018-01-09 PROCEDURE — 99999 PR PBB SHADOW E&M-EST. PATIENT-LVL III: CPT | Mod: PBBFAC,,, | Performed by: FAMILY MEDICINE

## 2018-01-09 PROCEDURE — 99213 OFFICE O/P EST LOW 20 MIN: CPT | Mod: PBBFAC,PO | Performed by: FAMILY MEDICINE

## 2018-01-09 NOTE — PATIENT INSTRUCTIONS

## 2018-01-09 NOTE — PROGRESS NOTES
Subjective:       Patient ID: Jose Dior is a 52 y.o. male.    Chief Complaint: Annual Exam    HPI     Follow up  Pt is here to follow up multiple chronic medical conditions.   Pt does reports compliance with medications.   The pt has a current PMH of obesity, HLD, CKD and prediabetes.   As it relates to exercise, the pt reports that he remains active.   As far as smoking is concerned, the pt denies.   Home BP measurements have been good.  Home glucose measurements have been low 100's.  Pt has been making attempts at eating healthy.  Health maintenance addressed and updated in chart.    Review of Systems   Constitutional: Negative for chills and fever.   Respiratory: Negative for chest tightness and shortness of breath.    Cardiovascular: Negative for chest pain and leg swelling.   Gastrointestinal: Negative for abdominal pain, constipation and vomiting.   Genitourinary: Negative for decreased urine volume, dysuria and hematuria.   Musculoskeletal: Positive for neck pain. Negative for arthralgias.   Neurological: Positive for headaches. Negative for weakness and light-headedness.       Objective:      Physical Exam   Constitutional: He appears well-developed and well-nourished. No distress.   Obese male in no acute distress.   HENT:   Head: Normocephalic and atraumatic.   Mouth/Throat: Oropharynx is clear and moist. No oropharyngeal exudate.   Eyes: EOM are normal. Pupils are equal, round, and reactive to light.   Neck: Normal range of motion. Neck supple. No thyromegaly present.   Cardiovascular: Normal rate, regular rhythm, normal heart sounds and intact distal pulses.    Pulmonary/Chest: Effort normal and breath sounds normal. No respiratory distress. He has no wheezes.   Abdominal: Soft. Bowel sounds are normal. He exhibits no distension and no mass. There is no tenderness.   Musculoskeletal: He exhibits no edema.   Lymphadenopathy:     He has no cervical adenopathy.   Neurological: He is alert.    Skin: Skin is warm. No rash noted. No erythema.   Psychiatric: He has a normal mood and affect. His behavior is normal.   Vitals reviewed.      Assessment:       1. Hyperlipidemia, unspecified hyperlipidemia type    2. CKD (chronic kidney disease) stage 3, GFR 30-59 ml/min    3. Obesity (BMI 30-39.9)    4. Prostate cancer screening    5. Prediabetes        Plan:       1. Hyperlipidemia, unspecified hyperlipidemia type  The 10-year ASCVD risk score (Albert BARTHOLOMEW Jr., et al., 2013) is: 5.2%    Values used to calculate the score:      Age: 52 years      Sex: Male      Is Non- : Yes      Diabetic: No      Tobacco smoker: No      Systolic Blood Pressure: 122 mmHg      Is BP treated: No      HDL Cholesterol: 53 mg/dL      Total Cholesterol: 190 mg/dL  - Lipid panel; Future  Continue pravastatin.     2. CKD (chronic kidney disease) stage 3, GFR 30-59 ml/min  The patient will need to increase water intake to help protect kidneys. Avoid NSAID's including ibuprofen, Aleve and high doses of aspirin.  - Microalbumin/creatinine urine ratio; Future  - Comprehensive metabolic panel; Future    3. Obesity (BMI 30-39.9)  Patient has been advised to continue to maintain a healthy lifestyle, including regular exercise and consuming a well balanced diet.     4. Prostate cancer screening  - PSA, Screening; Future    5. Prediabetes  - Hemoglobin A1c; Future    Patient readiness: acceptance and barriers:none    During the course of the visit the patient was educated and counseled about the following:     Obesity:   Informal exercise measures discussed, e.g. taking stairs instead of elevator.  Regular aerobic exercise program discussed.    Goals: Obesity: Reduce calorie intake and BMI    Did patient meet goals/outcomes: No    The following self management tools provided: excercise log    Patient Instructions (the written plan) was given to the patient/family.     Time spent with patient: 15 minutes    Barriers to  medications present (no )    Adverse reactions to current medications (no)    Over the counter medications reviewed (Yes)    Portions of this note were created using Dragon voice recognition software. There may be voice recognition errors found in the text, and attempts were made to correct these errors prior to signature    Jessee Lopez MD    Family Medicine  1/9/2018

## 2018-01-10 ENCOUNTER — LAB VISIT (OUTPATIENT)
Dept: LAB | Facility: HOSPITAL | Age: 53
End: 2018-01-10
Attending: FAMILY MEDICINE
Payer: OTHER GOVERNMENT

## 2018-01-10 DIAGNOSIS — R73.03 PREDIABETES: ICD-10-CM

## 2018-01-10 DIAGNOSIS — N18.30 CKD (CHRONIC KIDNEY DISEASE) STAGE 3, GFR 30-59 ML/MIN: ICD-10-CM

## 2018-01-10 DIAGNOSIS — Z12.5 PROSTATE CANCER SCREENING: ICD-10-CM

## 2018-01-10 DIAGNOSIS — E78.5 HYPERLIPIDEMIA, UNSPECIFIED HYPERLIPIDEMIA TYPE: ICD-10-CM

## 2018-01-10 LAB
ALBUMIN SERPL BCP-MCNC: 4.2 G/DL
ALP SERPL-CCNC: 61 U/L
ALT SERPL W/O P-5'-P-CCNC: 12 U/L
ANION GAP SERPL CALC-SCNC: 7 MMOL/L
AST SERPL-CCNC: 15 U/L
BILIRUB SERPL-MCNC: 0.6 MG/DL
BUN SERPL-MCNC: 14 MG/DL
CALCIUM SERPL-MCNC: 9.9 MG/DL
CHLORIDE SERPL-SCNC: 108 MMOL/L
CHOLEST SERPL-MCNC: 170 MG/DL
CHOLEST/HDLC SERPL: 3.2 {RATIO}
CO2 SERPL-SCNC: 27 MMOL/L
COMPLEXED PSA SERPL-MCNC: 0.47 NG/ML
CREAT SERPL-MCNC: 1.7 MG/DL
EST. GFR  (AFRICAN AMERICAN): 52.4 ML/MIN/1.73 M^2
EST. GFR  (NON AFRICAN AMERICAN): 45.4 ML/MIN/1.73 M^2
ESTIMATED AVG GLUCOSE: 120 MG/DL
GLUCOSE SERPL-MCNC: 121 MG/DL
HBA1C MFR BLD HPLC: 5.8 %
HDLC SERPL-MCNC: 53 MG/DL
HDLC SERPL: 31.2 %
LDLC SERPL CALC-MCNC: 102.2 MG/DL
NONHDLC SERPL-MCNC: 117 MG/DL
POTASSIUM SERPL-SCNC: 4.2 MMOL/L
PROT SERPL-MCNC: 7.5 G/DL
SODIUM SERPL-SCNC: 142 MMOL/L
TRIGL SERPL-MCNC: 74 MG/DL

## 2018-01-10 PROCEDURE — 83036 HEMOGLOBIN GLYCOSYLATED A1C: CPT

## 2018-01-10 PROCEDURE — 80061 LIPID PANEL: CPT

## 2018-01-10 PROCEDURE — 36415 COLL VENOUS BLD VENIPUNCTURE: CPT | Mod: PO

## 2018-01-10 PROCEDURE — 84153 ASSAY OF PSA TOTAL: CPT

## 2018-01-10 PROCEDURE — 80053 COMPREHEN METABOLIC PANEL: CPT

## 2018-01-12 ENCOUNTER — TELEPHONE (OUTPATIENT)
Dept: FAMILY MEDICINE | Facility: CLINIC | Age: 53
End: 2018-01-12

## 2018-01-12 NOTE — TELEPHONE ENCOUNTER
----- Message from Jessee Lopez MD sent at 1/11/2018 12:20 PM CST -----  Please inform patient that lab results are not concerning/normal.  A1c level is 5.8%.  No changes to medications at this time and if there are any questions, to give us a call. Thank You,    Jessee Lopez MD  Family Medicine  1/11/2018

## 2018-04-02 ENCOUNTER — TELEPHONE (OUTPATIENT)
Dept: FAMILY MEDICINE | Facility: CLINIC | Age: 53
End: 2018-04-02

## 2018-04-02 NOTE — TELEPHONE ENCOUNTER
----- Message from RT María sent at 4/2/2018  2:20 PM CDT -----  Contact: pt    pt , requesting lab test orders in and schedule prior to his appt of 04/06/2018, Friday, thanks.

## 2018-04-02 NOTE — TELEPHONE ENCOUNTER
"Labs ordered at appointment so we can discuss any issues prior to lab drawn.    Apt note on Friday says "check troponin levels." This is not something we normally check for as an elevated troponin is an EMERGENCY and hsould be evaluated in the ER. If he is having chest discomfort, SOB, palpitations, not feeling well- he needs ot go the ER now not wait for a clinic evaluation Friday.  "

## 2018-04-02 NOTE — TELEPHONE ENCOUNTER
Pt is requesting any required labs to be ordered prior to his appt on 04/06 that he can discuss results during office visit.

## 2018-04-02 NOTE — TELEPHONE ENCOUNTER
Spoke to patient notified and states understanding states he is not having any symptoms listed below notified patient if he develops any symptoms listed below to go to ER patient states understanding

## 2018-04-06 ENCOUNTER — LAB VISIT (OUTPATIENT)
Dept: LAB | Facility: HOSPITAL | Age: 53
End: 2018-04-06
Attending: PHYSICIAN ASSISTANT
Payer: OTHER GOVERNMENT

## 2018-04-06 ENCOUNTER — DOCUMENTATION ONLY (OUTPATIENT)
Dept: FAMILY MEDICINE | Facility: CLINIC | Age: 53
End: 2018-04-06

## 2018-04-06 ENCOUNTER — OFFICE VISIT (OUTPATIENT)
Dept: FAMILY MEDICINE | Facility: CLINIC | Age: 53
End: 2018-04-06
Payer: OTHER GOVERNMENT

## 2018-04-06 VITALS
WEIGHT: 210.75 LBS | HEART RATE: 89 BPM | HEIGHT: 70 IN | SYSTOLIC BLOOD PRESSURE: 134 MMHG | BODY MASS INDEX: 30.17 KG/M2 | DIASTOLIC BLOOD PRESSURE: 75 MMHG | TEMPERATURE: 98 F | OXYGEN SATURATION: 95 %

## 2018-04-06 DIAGNOSIS — R94.31 ABNORMAL EKG: ICD-10-CM

## 2018-04-06 DIAGNOSIS — R61 DIAPHORESIS: Primary | ICD-10-CM

## 2018-04-06 DIAGNOSIS — E16.2 HYPOGLYCEMIA: ICD-10-CM

## 2018-04-06 DIAGNOSIS — R61 DIAPHORESIS: ICD-10-CM

## 2018-04-06 DIAGNOSIS — E66.9 OBESITY (BMI 30-39.9): ICD-10-CM

## 2018-04-06 LAB
ANION GAP SERPL CALC-SCNC: 11 MMOL/L
BUN SERPL-MCNC: 14 MG/DL
CALCIUM SERPL-MCNC: 9.9 MG/DL
CHLORIDE SERPL-SCNC: 109 MMOL/L
CO2 SERPL-SCNC: 23 MMOL/L
CREAT SERPL-MCNC: 1.4 MG/DL
EST. GFR  (AFRICAN AMERICAN): >60 ML/MIN/1.73 M^2
EST. GFR  (NON AFRICAN AMERICAN): 57.4 ML/MIN/1.73 M^2
ESTIMATED AVG GLUCOSE: 111 MG/DL
GLUCOSE SERPL-MCNC: 85 MG/DL
HBA1C MFR BLD HPLC: 5.5 %
POTASSIUM SERPL-SCNC: 4.1 MMOL/L
SODIUM SERPL-SCNC: 143 MMOL/L
TSH SERPL DL<=0.005 MIU/L-ACNC: 1.11 UIU/ML

## 2018-04-06 PROCEDURE — 84443 ASSAY THYROID STIM HORMONE: CPT

## 2018-04-06 PROCEDURE — 80048 BASIC METABOLIC PNL TOTAL CA: CPT

## 2018-04-06 PROCEDURE — 99999 PR PBB SHADOW E&M-EST. PATIENT-LVL IV: CPT | Mod: PBBFAC,,, | Performed by: PHYSICIAN ASSISTANT

## 2018-04-06 PROCEDURE — 99214 OFFICE O/P EST MOD 30 MIN: CPT | Mod: PBBFAC,PO | Performed by: PHYSICIAN ASSISTANT

## 2018-04-06 PROCEDURE — 93010 ELECTROCARDIOGRAM REPORT: CPT | Mod: S$PBB,,, | Performed by: INTERNAL MEDICINE

## 2018-04-06 PROCEDURE — 83036 HEMOGLOBIN GLYCOSYLATED A1C: CPT

## 2018-04-06 PROCEDURE — 99214 OFFICE O/P EST MOD 30 MIN: CPT | Mod: S$PBB,,, | Performed by: PHYSICIAN ASSISTANT

## 2018-04-06 PROCEDURE — 83835 ASSAY OF METANEPHRINES: CPT

## 2018-04-06 PROCEDURE — 82384 ASSAY THREE CATECHOLAMINES: CPT

## 2018-04-06 PROCEDURE — 93005 ELECTROCARDIOGRAM TRACING: CPT | Mod: PBBFAC,PO | Performed by: INTERNAL MEDICINE

## 2018-04-06 PROCEDURE — 36415 COLL VENOUS BLD VENIPUNCTURE: CPT | Mod: PO

## 2018-04-06 RX ORDER — HYDROCODONE BITARTRATE AND ACETAMINOPHEN 7.5; 325 MG/1; MG/1
TABLET ORAL
COMMUNITY
Start: 2018-01-25 | End: 2018-11-28

## 2018-04-06 NOTE — PROGRESS NOTES
Subjective:       Patient ID: Jose Dior is a 52 y.o. male.    Chief Complaint: excessive sweating    HPI   Patient is a 52 year old AA male presenting to the clinic with c/o excessive diaphoresis on exertion and occasionally at rest. He reports night sweats x 1 in the past month. He is mostly concerned because he had two neighbors that had cardiac events whose only symptoms were sweating episodes. We discussed possible side effect of restoril but he reports taking this only about once every 3 months or so due to side effects. He has noticed some lower sugars on occasion- 88 & 64. His last A1C approximately 2 months ago was 5.8. He taking metformin 500mg daily.   Review of Systems   Constitutional: Negative for activity change, appetite change, chills, fatigue and fever.   HENT: Negative for congestion, ear pain, postnasal drip, rhinorrhea and sinus pressure.    Respiratory: Negative for cough, shortness of breath and wheezing.    Cardiovascular: Negative for chest pain and palpitations.   Gastrointestinal: Negative for abdominal pain, constipation, diarrhea, nausea and vomiting.   Genitourinary: Negative for frequency, hematuria and urgency.   Musculoskeletal: Negative for back pain and gait problem.   Skin: Negative for rash.   Neurological: Negative for syncope, weakness and headaches.   Psychiatric/Behavioral: Negative for agitation and confusion.       Objective:      Physical Exam   Constitutional: Vital signs are normal. He appears well-developed and well-nourished. No distress.   Cardiovascular: Normal rate, regular rhythm, S1 normal, S2 normal and normal heart sounds.  Exam reveals no gallop.    No murmur heard.  Pulses:       Radial pulses are 2+ on the right side, and 2+ on the left side.   Pulmonary/Chest: Effort normal and breath sounds normal. No respiratory distress. He has no wheezes. He has no rhonchi.   Skin: Skin is warm. He is not diaphoretic.   Psychiatric: He has a normal mood and  affect. His speech is normal and behavior is normal. Judgment and thought content normal. Cognition and memory are normal.       Assessment:       1. Diaphoresis    2. Hypoglycemia    3. Abnormal EKG    4. Obesity (BMI 30-39.9)        Plan:       Jose was seen today for excessive sweating.    Diagnoses and all orders for this visit:    Diaphoresis  -     IN OFFICE EKG 12-LEAD (to Muse)  -     TSH; Future  -     Basic metabolic panel; Future  -     CATECHOLAMINES, FRACTIONATED, PLASMA; Future  -     METANEPHRINES, PLASMA FREE; Future  -     Cancel: Exercise stress echo with color flow; Future  -     Hemoglobin A1c; Future  -     Echocardiogram stress test; Future    Hypoglycemia  -     Hemoglobin A1c; Future    Abnormal EKG  -     Cancel: Exercise stress echo with color flow; Future  -     Echocardiogram stress test; Future    Obesity (BMI 30-39.9)  Patient readiness: acceptance and barriers:none    During the course of the visit the patient was educated and counseled about the following:     Diabetes:  Discussed general issues about diabetes pathophysiology and management.  Obesity:   General weight loss/lifestyle modification strategies discussed (elicit support from others; identify saboteurs; non-food rewards, etc).    Goals: Diabetes: Maintain Hemoglobin A1C below 7 and Obesity: Reduce calorie intake and BMI    Did patient meet goals/outcomes: No    The following self management tools provided: declined    Patient Instructions (the written plan) was given to the patient/family.     Time spent with patient: 20 minutes    Barriers to medications present (no )    Adverse reactions to current medications (no)    Over the counter medications reviewed (Yes)

## 2018-04-13 ENCOUNTER — TELEPHONE (OUTPATIENT)
Dept: FAMILY MEDICINE | Facility: CLINIC | Age: 53
End: 2018-04-13

## 2018-04-13 DIAGNOSIS — E16.2 HYPOGLYCEMIA: Primary | ICD-10-CM

## 2018-04-13 LAB
CATECHOLS PLAS-MCNC: 451 PG/ML
DOPAMINE SERPL-MCNC: 23 PG/ML
EPINEPH PLAS-MCNC: 31 PG/ML
METANEPH FREE SERPL-MCNC: 42 PG/ML
METANEPHS SERPL-MCNC: 118 PG/ML
NOREPINEPH PLAS-MCNC: 397 PG/ML
NORMETANEPH FREE SERPL-MCNC: 76 PG/ML

## 2018-04-13 NOTE — TELEPHONE ENCOUNTER
----- Message from Frank Flores sent at 4/13/2018 10:45 AM CDT -----  Contact: Pt  Pt is returning a call back from nurse. Pt can be reached at [phone] (knly)

## 2018-04-13 NOTE — TELEPHONE ENCOUNTER
----- Message from SUNG Ng sent at 4/13/2018  7:48 AM CDT -----  Some of your labs are still pending, however, your thyroid lab is normal. Your electrolytes look good & kidney function back within normal range.    A1C shows an average sugar of 111. I suggest we hold the metformin for 3 months. Continue with diet, exercise and lets recheck this again in 3 months and see if this helps with your sweating episodes.

## 2018-04-19 ENCOUNTER — HOSPITAL ENCOUNTER (OUTPATIENT)
Dept: CARDIOLOGY | Facility: HOSPITAL | Age: 53
Discharge: HOME | End: 2018-04-19
Attending: PHYSICIAN ASSISTANT
Payer: OTHER GOVERNMENT

## 2018-04-19 VITALS
HEART RATE: 86 BPM | SYSTOLIC BLOOD PRESSURE: 167 MMHG | DIASTOLIC BLOOD PRESSURE: 86 MMHG | BODY MASS INDEX: 30.06 KG/M2 | WEIGHT: 210 LBS | HEIGHT: 70 IN

## 2018-04-19 DIAGNOSIS — R61 DIAPHORESIS: ICD-10-CM

## 2018-04-19 DIAGNOSIS — R94.31 ABNORMAL EKG: ICD-10-CM

## 2018-04-19 LAB
AORTIC ROOT ANNULUS: 2.71 CM
AORTIC VALVE CUSP SEPERATION: 1.74 CM
BSA FOR ECHO PROCEDURE: 2.17 M2
CV ECHO LV RWT: 0.59 CM
DOP CALC LVOT AREA: 3.3 CM2
DOP CALC LVOT DIAMETER: 2.05 CM
ECHO EF ESTIMATED: 64 %
ECHO LV POSTERIOR WALL: 1.21 CM (ref 0.6–1.1)
FRACTIONAL SHORTENING: 34 % (ref 28–44)
INTERVENTRICULAR SEPTUM: 1.21 CM (ref 0.6–1.1)
LEFT ATRIUM SIZE: 3.89 CM
LEFT INTERNAL DIMENSION IN SYSTOLE: 2.71 CM (ref 2.1–4)
LEFT VENTRICULAR INTERNAL DIMENSION IN DIASTOLE: 4.13 CM (ref 3.5–6)
RIGHT VENTRICULAR END-DIASTOLIC DIMENSION: 2.75 CM
STRESS ECHO POST ESTIMATED WORKLOAD: 15 METS
STRESS ECHO POST EXERCISE DUR MIN: 9 MIN
STRESS ECHO POST EXERCISE DUR SEC: 0

## 2018-04-19 PROCEDURE — C8930 TTE W OR W/O CONTR, CONT ECG: HCPCS

## 2018-04-19 PROCEDURE — 93351 STRESS TTE COMPLETE: CPT | Mod: 26,,, | Performed by: INTERNAL MEDICINE

## 2018-04-24 ENCOUNTER — TELEPHONE (OUTPATIENT)
Dept: FAMILY MEDICINE | Facility: CLINIC | Age: 53
End: 2018-04-24

## 2018-04-24 NOTE — TELEPHONE ENCOUNTER
----- Message from SUNG Ng sent at 4/20/2018  8:04 AM CDT -----  Please inform patient that echo stress test shows that the heart is working well & there was no concern for lack of oxygen to the heart. His left ventricle is mildly enlarged which occurs with untreated high blood pressure. We need to make sure his blood pressure stays well controlled.     How are the sweating episodes? Have they changed at all? He had a very mildly elevated dopamine level but all other hormone levels were normal.

## 2018-04-25 DIAGNOSIS — R61 DIAPHORESIS: Primary | ICD-10-CM

## 2018-11-09 ENCOUNTER — TELEPHONE (OUTPATIENT)
Dept: FAMILY MEDICINE | Facility: CLINIC | Age: 53
End: 2018-11-09

## 2018-11-09 NOTE — TELEPHONE ENCOUNTER
Pt states he received text last night for possibly scheduling sooner appt. He would like to be seen Monday or Tuesday of next week. Informed patient no appt available to on those days, he will keep 11.27.18 appt

## 2018-11-09 NOTE — TELEPHONE ENCOUNTER
----- Message from Naz Barbour sent at 11/9/2018  9:35 AM CST -----  Contact: Self  Patient states he received a call about an earlier appt and requesting a call back to see if it is still available.  Call back at [phone] (home).  Thanks

## 2018-11-28 ENCOUNTER — OFFICE VISIT (OUTPATIENT)
Dept: FAMILY MEDICINE | Facility: CLINIC | Age: 53
End: 2018-11-28
Payer: OTHER GOVERNMENT

## 2018-11-28 ENCOUNTER — DOCUMENTATION ONLY (OUTPATIENT)
Dept: FAMILY MEDICINE | Facility: CLINIC | Age: 53
End: 2018-11-28

## 2018-11-28 VITALS
OXYGEN SATURATION: 95 % | DIASTOLIC BLOOD PRESSURE: 76 MMHG | HEIGHT: 70 IN | WEIGHT: 226 LBS | SYSTOLIC BLOOD PRESSURE: 116 MMHG | BODY MASS INDEX: 32.35 KG/M2 | HEART RATE: 86 BPM | RESPIRATION RATE: 16 BRPM

## 2018-11-28 DIAGNOSIS — Z02.4 ENCOUNTER FOR CDL (COMMERCIAL DRIVING LICENSE) EXAM: Primary | ICD-10-CM

## 2018-11-28 PROCEDURE — 99396 PREV VISIT EST AGE 40-64: CPT | Mod: S$GLB,,, | Performed by: INTERNAL MEDICINE

## 2018-11-28 RX ORDER — LOSARTAN POTASSIUM 50 MG/1
25 TABLET ORAL DAILY
COMMUNITY
Start: 2018-10-30 | End: 2019-11-06

## 2018-11-28 NOTE — PROGRESS NOTES
"Subjective:       Patient ID: Jose Dior is a 53 y.o. male.    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  He has prediabetes and sleep apnea.  He is compliant with the CPAP.  He has well-controlled hypertension.  Review of Systems      Objective:      Vitals:    11/28/18 1423   BP: 116/76   Pulse: 86   Resp: 16   SpO2: 95%   Weight: 102.5 kg (225 lb 15.5 oz)   Height: 5' 10" (1.778 m)   PainSc: 0-No pain     Physical Exam   Constitutional: He appears well-developed and well-nourished.   Cardiovascular: Normal rate, regular rhythm and normal heart sounds.   Pulmonary/Chest: Effort normal and breath sounds normal.   Abdominal: Soft. There is no tenderness.   Neurological: He is alert.   Psychiatric: He has a normal mood and affect. His behavior is normal. Thought content normal.   Nursing note and vitals reviewed.        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      "

## 2018-11-28 NOTE — PROGRESS NOTES
Health Maintenance Due   Topic Date Due    TETANUS VACCINE  11/18/1983    Influenza Vaccine  08/01/2018

## 2018-11-29 ENCOUNTER — HOSPITAL ENCOUNTER (EMERGENCY)
Facility: HOSPITAL | Age: 53
Discharge: HOME | End: 2018-11-29
Attending: EMERGENCY MEDICINE
Payer: OTHER GOVERNMENT

## 2018-11-29 VITALS
WEIGHT: 225 LBS | DIASTOLIC BLOOD PRESSURE: 64 MMHG | HEIGHT: 70 IN | OXYGEN SATURATION: 92 % | TEMPERATURE: 98 F | HEART RATE: 74 BPM | SYSTOLIC BLOOD PRESSURE: 127 MMHG | BODY MASS INDEX: 32.21 KG/M2 | RESPIRATION RATE: 20 BRPM

## 2018-11-29 DIAGNOSIS — S29.019A THORACIC MYOFASCIAL STRAIN, INITIAL ENCOUNTER: Primary | ICD-10-CM

## 2018-11-29 DIAGNOSIS — M54.9 BACK PAIN: ICD-10-CM

## 2018-11-29 DIAGNOSIS — R07.9 CHEST PAIN: ICD-10-CM

## 2018-11-29 LAB
ALBUMIN SERPL BCP-MCNC: 4.5 G/DL
ALP SERPL-CCNC: 56 U/L
ALT SERPL W/O P-5'-P-CCNC: 30 U/L
ANION GAP SERPL CALC-SCNC: 13 MMOL/L
AST SERPL-CCNC: 20 U/L
BASOPHILS # BLD AUTO: 0 K/UL
BASOPHILS NFR BLD: 0.4 %
BILIRUB SERPL-MCNC: 0.5 MG/DL
BUN SERPL-MCNC: 15 MG/DL
CALCIUM SERPL-MCNC: 10 MG/DL
CHLORIDE SERPL-SCNC: 104 MMOL/L
CO2 SERPL-SCNC: 23 MMOL/L
CREAT SERPL-MCNC: 1.5 MG/DL
DIFFERENTIAL METHOD: ABNORMAL
EOSINOPHIL # BLD AUTO: 0 K/UL
EOSINOPHIL NFR BLD: 0.4 %
ERYTHROCYTE [DISTWIDTH] IN BLOOD BY AUTOMATED COUNT: 14.1 %
EST. GFR  (AFRICAN AMERICAN): >60 ML/MIN/1.73 M^2
EST. GFR  (NON AFRICAN AMERICAN): 52 ML/MIN/1.73 M^2
GLUCOSE SERPL-MCNC: 127 MG/DL
HCT VFR BLD AUTO: 48.5 %
HGB BLD-MCNC: 16.3 G/DL
LYMPHOCYTES # BLD AUTO: 2.3 K/UL
LYMPHOCYTES NFR BLD: 36.6 %
MCH RBC QN AUTO: 29.2 PG
MCHC RBC AUTO-ENTMCNC: 33.6 G/DL
MCV RBC AUTO: 87 FL
MONOCYTES # BLD AUTO: 0.5 K/UL
MONOCYTES NFR BLD: 7.8 %
NEUTROPHILS # BLD AUTO: 3.5 K/UL
NEUTROPHILS NFR BLD: 54.8 %
PLATELET # BLD AUTO: 201 K/UL
PMV BLD AUTO: 9.1 FL
POTASSIUM SERPL-SCNC: 3.9 MMOL/L
PROT SERPL-MCNC: 7.9 G/DL
RBC # BLD AUTO: 5.57 M/UL
SODIUM SERPL-SCNC: 140 MMOL/L
TROPONIN I SERPL DL<=0.01 NG/ML-MCNC: <0.006 NG/ML
WBC # BLD AUTO: 6.4 K/UL

## 2018-11-29 PROCEDURE — 85025 COMPLETE CBC W/AUTO DIFF WBC: CPT

## 2018-11-29 PROCEDURE — 96372 THER/PROPH/DIAG INJ SC/IM: CPT

## 2018-11-29 PROCEDURE — 80053 COMPREHEN METABOLIC PANEL: CPT

## 2018-11-29 PROCEDURE — 93005 ELECTROCARDIOGRAM TRACING: CPT

## 2018-11-29 PROCEDURE — 99284 EMERGENCY DEPT VISIT MOD MDM: CPT | Mod: 25

## 2018-11-29 PROCEDURE — 36415 COLL VENOUS BLD VENIPUNCTURE: CPT

## 2018-11-29 PROCEDURE — 84484 ASSAY OF TROPONIN QUANT: CPT

## 2018-11-29 PROCEDURE — 96374 THER/PROPH/DIAG INJ IV PUSH: CPT

## 2018-11-29 PROCEDURE — 63600175 PHARM REV CODE 636 W HCPCS: Performed by: EMERGENCY MEDICINE

## 2018-11-29 RX ORDER — ORPHENADRINE CITRATE 30 MG/ML
60 INJECTION INTRAMUSCULAR; INTRAVENOUS
Status: COMPLETED | OUTPATIENT
Start: 2018-11-29 | End: 2018-11-29

## 2018-11-29 RX ORDER — DICLOFENAC SODIUM 50 MG/1
50 TABLET, DELAYED RELEASE ORAL 3 TIMES DAILY
Qty: 15 TABLET | Refills: 0 | Status: SHIPPED | OUTPATIENT
Start: 2018-11-29 | End: 2019-11-06

## 2018-11-29 RX ORDER — KETOROLAC TROMETHAMINE 30 MG/ML
30 INJECTION, SOLUTION INTRAMUSCULAR; INTRAVENOUS
Status: COMPLETED | OUTPATIENT
Start: 2018-11-29 | End: 2018-11-29

## 2018-11-29 RX ORDER — CYCLOBENZAPRINE HCL 10 MG
10 TABLET ORAL 3 TIMES DAILY PRN
Qty: 15 TABLET | Refills: 0 | Status: SHIPPED | OUTPATIENT
Start: 2018-11-29 | End: 2018-12-04

## 2018-11-29 RX ADMIN — ORPHENADRINE CITRATE 60 MG: 30 INJECTION INTRAMUSCULAR; INTRAVENOUS at 05:11

## 2018-11-29 RX ADMIN — KETOROLAC TROMETHAMINE 30 MG: 30 INJECTION, SOLUTION INTRAMUSCULAR at 05:11

## 2018-11-29 NOTE — ED PROVIDER NOTES
Encounter Date: 11/29/2018    SCRIBE #1 NOTE: I, Ping Lopez, am scribing for, and in the presence of, Dr. Hammer.       History     Chief Complaint   Patient presents with    Chest Pain     with radiation to back       Time seen by provider: 5:28 PM on 11/29/2018    The patient is a 53 y.o. male who presents to the ED with complaint of sudden onset of squeezing back pain that began 2 hours ago and is radiating to his left shoulder. Patient was sitting at the [facility] when the pain began and he had difficulty breathing due to pain. The pain is worse with movement, particularly standing. Patient is currently in physical therapy for degenerative disc disease effecting his lower back. This pain is dissimilar. The patient denies fever, sweating, or any other symptoms at this time. PMHx of DM and GERD. No pertinent PSHx.       The history is provided by the patient.     Review of patient's allergies indicates:   Allergen Reactions    Ibuprofen      Kidney condition     Past Medical History:   Diagnosis Date    Blood transfusion     Diabetes mellitus     Diverticulosis     GERD (gastroesophageal reflux disease)      Past Surgical History:   Procedure Laterality Date    COLONOSCOPY  5/29/2013    Dr Casillas - [facility]    COLONOSCOPY N/A 5/29/2013    Performed by Jeremy [facility]illas [facility] at [facility]    LIP REPAIR      Laceration (Top)    WISDOM TOOTH EXTRACTION       x 4     Family History   Problem Relation Age of Onset    Cancer Mother         Breast    Diabetes Mother     Kidney disease Brother     Heart disease Father 42        MI    Diabetes Sister     Urolithiasis Neg Hx     Prostate cancer Neg Hx     Kidney cancer Neg Hx      Social History     Tobacco Use    Smoking status: Never Smoker    Smokeless tobacco: Never Used   Substance Use Topics    Alcohol use: No     Comment: Rare    Drug use: No     Review of Systems   Constitutional: Negative for activity change, appetite change, chills, fatigue and  fever.   Eyes: Negative for visual disturbance.   Respiratory: Negative for apnea and shortness of breath.    Cardiovascular: Negative for chest pain and palpitations.   Gastrointestinal: Negative for abdominal distention and abdominal pain.   Genitourinary: Negative for difficulty urinating.   Musculoskeletal: Positive for back pain. Negative for neck pain.   Skin: Negative for pallor and rash.   Neurological: Negative for headaches.   Hematological: Does not bruise/bleed easily.   Psychiatric/Behavioral: Negative for agitation.       Physical Exam     Initial Vitals [11/29/18 1720]   BP Pulse Resp Temp SpO2   (!) 142/81 91 20 98 °F (36.7 °C) 96 %      MAP       --         Physical Exam    Nursing note and vitals reviewed.  Constitutional: He appears well-developed and well-nourished.   HENT:   Head: Normocephalic and atraumatic.   Eyes: Conjunctivae are normal.   Neck: Normal range of motion. Neck supple.   Cardiovascular: Normal rate, regular rhythm and normal heart sounds. Exam reveals no gallop and no friction rub.    No murmur heard.  Pulmonary/Chest: Breath sounds normal. No respiratory distress. He has no wheezes. He has no rhonchi. He has no rales.   Abdominal: Soft. He exhibits no distension. There is no tenderness.   Musculoskeletal: Normal range of motion.   Mid-bilateral paraspinous pain without tenderness.   Neurological: He is alert and oriented to person, place, and time.   Skin: Skin is warm and dry.   Psychiatric: He has a normal mood and affect.         ED Course   Procedures  Labs Reviewed   CBC W/ AUTO DIFFERENTIAL - Abnormal; Notable for the following components:       Result Value    MPV 9.1 (*)     All other components within normal limits   COMPREHENSIVE METABOLIC PANEL - Abnormal; Notable for the following components:    Glucose 127 (*)     Creatinine 1.5 (*)     eGFR if non  52 (*)     All other components within normal limits   TROPONIN I     EKG Readings: (Independently  Interpreted)   Initial Reading: No STEMI. Rhythm: Normal Sinus Rhythm. Heart Rate: 90 bpm. ST Segments: Normal ST Segments. T Waves: Normal.       Imaging Results          X-Ray Chest PA And Lateral (Final result)  Result time 11/29/18 17:50:53    Final result by Epifanio Ospina Jr., MD (11/29/18 17:50:53)                 Impression:      Ill-defined soft tissue density overlies the lower thoracic spine on the lateral view.  This may represent a retrocardiac infiltrate but follow-up chest x-ray in 1 month to assure resolution and to rule out a mass is recommended.      Electronically signed by: Epifanio Ospina MD  Date:    11/29/2018  Time:    17:50             Narrative:    EXAMINATION:  XR CHEST PA AND LATERAL    CLINICAL HISTORY:  Dorsalgia, unspecified    TECHNIQUE:  PA and lateral views of the chest were performed.    COMPARISON:  Prior chest of March 2, 2015.    FINDINGS:  The mediastinal and cardiac size and contours are normal.  On the lateral film there is increased soft tissue density over the lower thoracic spine.  This is likely a retrocardiac infiltrate but follow-up chest x-ray to assure clearance is recommended.  The upper lung fields are clear.                                 Medical Decision Making:   History:   Old Medical Records: I decided to obtain old medical records.  Independently Interpreted Test(s):   I have ordered and independently interpreted X-rays - see summary below.       <> Summary of X-Ray Reading(s): I discussed the case with my APC  and agree with the proposed course of treatment  I have ordered and independently interpreted EKG Reading(s) - see prior notes  Clinical Tests:   Lab Tests: Ordered and Reviewed  Radiological Study: Ordered and Reviewed  Medical Tests: Ordered and Reviewed  ED Management:  53-year-old male presents with positional back pain.  He has paraspinous tenderness and pain consistent with a myofascial strain.  There is no evidence of aortic dissection.   EKG is normal with no evidence cardiac ischemia.  He has no risk factors for PE with no tachycardia or hypoxia.            Scribe Attestation:   Scribe #1: I performed the above scribed service and the documentation accurately describes the services I performed. I attest to the accuracy of the note.               Clinical Impression:   The primary encounter diagnosis was Thoracic myofascial strain, initial encounter. Diagnoses of Chest pain and Back pain were also pertinent to this visit.      Disposition:   Disposition: Discharged  Condition: Stable                        Hayden Hammer III, MD  11/29/18 7834

## 2019-11-06 ENCOUNTER — OFFICE VISIT (OUTPATIENT)
Dept: FAMILY MEDICINE | Facility: CLINIC | Age: 54
End: 2019-11-06
Payer: OTHER GOVERNMENT

## 2019-11-06 ENCOUNTER — DOCUMENTATION ONLY (OUTPATIENT)
Dept: FAMILY MEDICINE | Facility: CLINIC | Age: 54
End: 2019-11-06

## 2019-11-06 VITALS
WEIGHT: 212.75 LBS | HEART RATE: 75 BPM | RESPIRATION RATE: 16 BRPM | BODY MASS INDEX: 30.46 KG/M2 | TEMPERATURE: 98 F | DIASTOLIC BLOOD PRESSURE: 76 MMHG | SYSTOLIC BLOOD PRESSURE: 124 MMHG | OXYGEN SATURATION: 97 % | HEIGHT: 70 IN

## 2019-11-06 DIAGNOSIS — Z02.4 ENCOUNTER FOR CDL (COMMERCIAL DRIVING LICENSE) EXAM: Primary | ICD-10-CM

## 2019-11-06 PROCEDURE — 99396 PR PREVENTIVE VISIT,EST,40-64: ICD-10-PCS | Mod: S$GLB,,, | Performed by: INTERNAL MEDICINE

## 2019-11-06 PROCEDURE — 99396 PREV VISIT EST AGE 40-64: CPT | Mod: S$GLB,,, | Performed by: INTERNAL MEDICINE

## 2019-11-06 NOTE — PROGRESS NOTES
Health Maintenance Due   Topic Date Due    TETANUS VACCINE  11/18/1983    Lipid Panel  01/10/2019

## 2019-11-06 NOTE — PROGRESS NOTES
"Subjective:      11:42 AM     Patient ID: Jose Dior is a 53 y.o. male.    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  He has a history of pre diabetes but is last hemoglobin A1c on metformin was 5.5.  He has never qualified for having diabetes.  He was on losartan but he does not have hypertension as he was given this to prevent hypertension.  Review of Systems      Objective:      Vitals:    11/06/19 1129   BP: 124/76   Pulse: 75   Resp: 16   Temp: 98 °F (36.7 °C)   TempSrc: Oral   SpO2: 97%   Weight: 96.5 kg (212 lb 11.9 oz)   Height: 5' 10" (1.778 m)   PainSc: 0-No pain     Physical Exam   Constitutional: He appears well-developed and well-nourished.   Cardiovascular: Normal rate, regular rhythm and normal heart sounds.   Pulmonary/Chest: Effort normal and breath sounds normal.   Abdominal: Soft. There is no tenderness.   Neurological: He is alert.   Psychiatric: He has a normal mood and affect. His behavior is normal. Thought content normal.   Nursing note and vitals reviewed.        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      "

## 2021-07-01 ENCOUNTER — PATIENT MESSAGE (OUTPATIENT)
Dept: ADMINISTRATIVE | Facility: OTHER | Age: 56
End: 2021-07-01

## 2022-07-29 ENCOUNTER — OCCUPATIONAL HEALTH (OUTPATIENT)
Dept: URGENT CARE | Facility: CLINIC | Age: 57
End: 2022-07-29

## 2022-07-29 ENCOUNTER — TELEPHONE (OUTPATIENT)
Dept: FAMILY MEDICINE | Facility: CLINIC | Age: 57
End: 2022-07-29
Payer: OTHER GOVERNMENT

## 2022-07-29 DIAGNOSIS — Z00.00 ENCOUNTER FOR PHYSICAL EXAMINATION: Primary | ICD-10-CM

## 2022-07-29 LAB — COLLECTION ONLY: NORMAL

## 2022-07-29 PROCEDURE — 99499 DOT PHYSICAL: ICD-10-PCS | Mod: S$GLB,,, | Performed by: NURSE PRACTITIONER

## 2022-07-29 PROCEDURE — 99499 UNLISTED E&M SERVICE: CPT | Mod: S$GLB,,, | Performed by: NURSE PRACTITIONER

## 2022-07-29 NOTE — TELEPHONE ENCOUNTER
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 Advised that he may go to [place] urgent care. Verbalized understanding.

## 2022-10-11 ENCOUNTER — OCCUPATIONAL HEALTH (OUTPATIENT)
Dept: URGENT CARE | Facility: CLINIC | Age: 57
End: 2022-10-11

## 2022-10-11 DIAGNOSIS — Z00.00 ENCOUNTER FOR PHYSICAL EXAMINATION: Primary | ICD-10-CM

## 2022-10-11 LAB — COLLECTION ONLY: NORMAL

## 2022-10-11 PROCEDURE — 99499 UNLISTED E&M SERVICE: CPT | Mod: S$GLB,,,

## 2022-10-11 PROCEDURE — 99499 DOT PHYSICAL: ICD-10-PCS | Mod: S$GLB,,,

## 2023-11-02 ENCOUNTER — OCCUPATIONAL HEALTH (OUTPATIENT)
Dept: URGENT CARE | Facility: CLINIC | Age: 58
End: 2023-11-02

## 2023-11-02 DIAGNOSIS — Z00.00 ENCOUNTER FOR PHYSICAL EXAMINATION: Primary | ICD-10-CM

## 2023-11-02 LAB — COLLECTION ONLY: NORMAL

## 2023-11-02 PROCEDURE — 99499 UNLISTED E&M SERVICE: CPT | Mod: S$GLB,,,

## 2023-11-02 PROCEDURE — 99499 DOT PHYSICAL: ICD-10-PCS | Mod: S$GLB,,,

## 2024-11-22 ENCOUNTER — CLINICAL SUPPORT (OUTPATIENT)
Dept: URGENT CARE | Facility: CLINIC | Age: 59
End: 2024-11-22
Payer: OTHER GOVERNMENT

## 2024-11-22 DIAGNOSIS — Z00.00 ROUTINE GENERAL MEDICAL EXAMINATION AT A HEALTH CARE FACILITY: Primary | ICD-10-CM
